# Patient Record
Sex: FEMALE
[De-identification: names, ages, dates, MRNs, and addresses within clinical notes are randomized per-mention and may not be internally consistent; named-entity substitution may affect disease eponyms.]

---

## 2019-10-02 ENCOUNTER — HOSPITAL ENCOUNTER (OUTPATIENT)
Dept: HOSPITAL 88 - MAMMO | Age: 44
End: 2019-10-02
Attending: OBSTETRICS & GYNECOLOGY
Payer: COMMERCIAL

## 2019-10-02 DIAGNOSIS — Z12.31: Primary | ICD-10-CM

## 2019-10-02 PROCEDURE — 77067 SCR MAMMO BI INCL CAD: CPT

## 2019-10-04 NOTE — DIAGNOSTIC IMAGING REPORT
#[id number] - [id number]

#BILATERAL DIGITAL SCREENING MAMMOGRAM WITH CAD: 10/2/2019

CLINICAL: Routine screening.  



No prior exams were available for comparison.  Current study contains 5 films.  

The tissue of both breasts is predominantly fatty.  

Current study was also evaluated with a Computer Aided Detection (CAD) system.  

Benign appearing calcifications are noted bilaterally.  

No significant masses, calcifications, or other findings are seen in either breast.  



IMPRESSION: BENIGN

There is no mammographic evidence of malignancy.  A 1 year screening mammogram is recommended. 

The patient will be notified by letter of the results.  





CONNER WHITEHEAD M.D.          

ct/penrad:10/3/2019 16:10:06  



Imaging Technologist: Alana KENNEY([place])([place]), [facility]

letter sent: Normal Exam  

Mammogram BI-RADS: 2 Benign